# Patient Record
Sex: FEMALE | Race: WHITE | Employment: PART TIME | URBAN - METROPOLITAN AREA
[De-identification: names, ages, dates, MRNs, and addresses within clinical notes are randomized per-mention and may not be internally consistent; named-entity substitution may affect disease eponyms.]

---

## 2018-10-18 ENCOUNTER — OFFICE VISIT (OUTPATIENT)
Dept: UROLOGY | Facility: CLINIC | Age: 33
End: 2018-10-18
Payer: COMMERCIAL

## 2018-10-18 VITALS
OXYGEN SATURATION: 100 % | BODY MASS INDEX: 22.68 KG/M2 | DIASTOLIC BLOOD PRESSURE: 80 MMHG | HEIGHT: 63 IN | HEART RATE: 101 BPM | SYSTOLIC BLOOD PRESSURE: 126 MMHG | WEIGHT: 128 LBS

## 2018-10-18 DIAGNOSIS — N18.6 END STAGE RENAL DISEASE (H): Primary | ICD-10-CM

## 2018-10-18 PROCEDURE — 99205 OFFICE O/P NEW HI 60 MIN: CPT | Performed by: UROLOGY

## 2018-10-18 RX ORDER — LAMOTRIGINE 100 MG/1
TABLET ORAL
COMMUNITY
Start: 2018-10-12

## 2018-10-18 RX ORDER — LAMOTRIGINE 200 MG/1
TABLET ORAL
COMMUNITY
Start: 2018-10-16

## 2018-10-18 RX ORDER — FOLIC ACID 1 MG/1
TABLET ORAL
COMMUNITY
Start: 2018-09-20

## 2018-10-18 RX ORDER — ETHINYL ESTRADIOL/DROSPIRENONE 0.02-3(28)
TABLET ORAL
COMMUNITY
Start: 2018-10-16

## 2018-10-18 ASSESSMENT — PAIN SCALES - GENERAL: PAINLEVEL: NO PAIN (0)

## 2018-10-18 NOTE — LETTER
Date:October 23, 2018      Patient was self referred, no letter generated. Do not send.        [facility] Health Information

## 2018-10-18 NOTE — NURSING NOTE
Chief Complaint   Patient presents with     Kidney Problem     Patient was seen by a Urologist in [facility] she has Tubular Sclerosis

## 2018-10-18 NOTE — MR AVS SNAPSHOT
"              After Visit Summary   10/18/2018    Marita Burns    MRN: [id number]           Patient Information     Date Of Birth          1985        Visit Information        Provider Department      10/18/2018 2:00 PM Nawaf Ingram MD [facility] [place]        Today's Diagnoses     End stage renal disease (H)    -  1       Follow-ups after your visit        Follow-up notes from your care team     Return if symptoms worsen or fail to improve.      Future tests that were ordered for you today     Open Future Orders        Priority Expected Expires Ordered    UA without Microscopic Routine  10/18/2019 10/19/2018            Who to contact     If you have questions or need follow up information about today's clinic visit or your schedule please contact [facility] directly at [phone].  Normal or non-critical lab and imaging results will be communicated to you by Property[employer], letter or phone within 4 business days after the clinic has received the results. If you do not hear from us within 7 days, please contact the clinic through Property[employer] or phone. If you have a critical or abnormal lab result, we will notify you by phone as soon as possible.  Submit refill requests through 19pay or call your pharmacy and they will forward the refill request to us. Please allow 3 business days for your refill to be completed.          Additional Information About Your Visit        Property[employer] Information     19pay lets you send messages to your doctor, view your test results, renew your prescriptions, schedule appointments and more. To sign up, go to [email or web address] . Click on \"Log in\" on the left side of the screen, which will take you to the Welcome page. Then click on \"Sign up Now\" on the right side of the page.     You will be asked to enter the access code listed below, as well as some personal information. Please follow the " "directions to create your username and password.     Your access code is: [email or web address]  Expires: 2019  3:08 PM     Your access code will  in 90 days. If you need help or a new code, please call your [facility] clinic or [phone].        Care EveryWhere ID     This is your Care EveryWhere ID. This could be used by other organizations to access your [facility] medical records  [id number]        Your Vitals Were     Pulse Height Pulse Oximetry BMI (Body Mass Index)          101 1.6 m (5' 3\") 100% 22.67 kg/m2         Blood Pressure from Last 3 Encounters:   10/18/18 126/80    Weight from Last 3 Encounters:   10/18/18 58.1 kg (128 lb)               Primary Care Provider Fax #    Physician No Ref-Primary [phone]       No address on file        Equal Access to Services     [facility] : Hadii natailya blanco Socherie, waaxda luqadaha, qaybta kaalmada adecornelioyada, gregory kearney . So [facility] [phone].    ATENCIÓN: Si habla español, tiene a kerr disposición servicios gratuitos de asistencia lingüística. Llame al [phone].    We comply with applicable federal civil rights laws and [place] laws. We do not discriminate on the basis of race, color, national origin, age, disability, sex, sexual orientation, or gender identity.            Thank you!     Thank you for choosing [facility] UROLOGY [facility]  for your care. Our goal is always to provide you with excellent care. Hearing back from our patients is one way we can continue to improve our services. Please take a few minutes to complete the written survey that you may receive in the mail after your visit with us. Thank you!             Your Updated Medication List - Protect others around you: Learn how to safely use, store and throw away your medicines at www.disposemymeds.org.          This list is accurate as of 10/18/18 11:59 PM.  Always use your most recent med list.                   Brand Name " Dispense Instructions for use Diagnosis    folic acid 1 MG tablet    FOLVITE          * lamoTRIgine 100 MG tablet    LaMICtal          * lamoTRIgine 200 MG tablet    LaMICtal          ADEN 3-0.02 MG per tablet   Generic drug:  drospirenone-ethinyl estradiol           * Notice:  This list has 2 medication(s) that are the same as other medications prescribed for you. Read the directions carefully, and ask your doctor or other care provider to review them with you.

## 2018-10-18 NOTE — LETTER
10/18/2018       RE: Marita Burns  [address] [address]     Dear Colleague,    Thank you for referring your patient, Marita Burns, to the [facility] [place] at [facility]. Please see a copy of my visit note below.    History: It is a great pleasure to meet this very pleasant 33-year-old lady in initial consultation today.  She is here today with her mother.  In summary, she has tuberous sclerosis complex and is seeking my advice regarding lesions in her right kidney.  She had developed seizures at 3 months of age and was diagnosed with tuberous sclerosis.  These were not infantile spasms as such and were controlled.  There is evidence of mild cognitive disability now, but she has full-time work, lives in [place], is articulate and attentive.  She does still get some seizures, but these are mainly petit mal attacks.  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  However most significantly for this consultation, she had a left nephrectomy for renal mass approximately 15 years ago, presents here currently with serum creatinines of 2.85, and there have been recommendations for her to have the remaining kidney removed and to be then considered for renal transplantation.  She works full-time and has an active life at the present time, and has no major complaints at this time.    History reviewed. No pertinent past medical history.    Past Surgical History:   Procedure Laterality Date     NEPHRECTOMY         History reviewed. No pertinent family history.    Social History     Social History     Marital status: Single     Spouse name: N/A     Number of children: N/A     Years of education: N/A     Occupational History     Not on file.     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 patches  Eyes: negative  Ears/Nose/Throat: negative  Respiratory: 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  Cardiovascular: History of possible cardiac rhabdomyomas early in her life  Gastrointestinal: negative  Genitourinary: History of renal masses  Musculoskeletal: negative  Neurologic: seizures  Psychiatric:  Possible very mild cognitive disability  Hematologic/Lymphatic/Immunologic: negative  Endocrine: negative    Exam:  /80 (BP Location: Left arm, Patient Position: Sitting, Cuff Size: Adult Regular)  Pulse 101  Ht 1.6 m (5' 3\")  Wt 58.1 kg (128 lb)  SpO2 100%  BMI 22.67 kg/m2    General Impression: Very pleasant lady who is not in any distress, is conversational and is well oriented in time place and person.    Mental status.  Calm and talkative    HEENT: There is no clinical evidence of jaundice, the mucous membranes are normal, there are some facial angiofibroma noted,    Skin: There were also some amelanotic patches on the skin    Lymph Nodes: None detected    Respiratory System: The respiratory cycle is normal    Cardiovascular: There is no peripheral edema    Abdominal: Abdominal examination is unremarkable    Extremities: The extremities are also unremarkable    Back and Flank: There is no evidence of kyphosis, scoliosis or lordosis.  There is no tenderness over the spine or flanks    Genital: Not examined    Rectal: Not " examined    Neurologic: There are no focal abnormal clinical neurological signs in the central, or peripheral nervous systems    Impression: This lady has a very complex history.  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  She did have her left kidney removed many years ago for renal masses although from the history I do not think she was undergoing a hemorrhage at that time.  The issue now is that her creatinine is 2.85, GFR of 15, she has masses in the remaining right kidney without evidence of hydronephrosis, the largest mass is 4.5 cm and there is a small mass about 3 cm in the lower part of the kidney with small angiomyolipomas seen in the kidney also.  The 2 predominant masses do appear similar and also both appear low in lipid content..  There is no evidence of any intralesional hemorrhage, flank pain or any other related symptoms she has not observed gross hematuria  This is a very challenging situation, however, it is highly likely that the masses in the kidney and not malignant as that the vast majority of low lipid lesions seen in tuberous sclerosis are in fact fact poor benign angiomyolipomas.  I have the opportunity to review the records I received recent indicates the growth rate is very slow which would definitely favor this being benign.  Removal of the kidney would then require her to be on dialysis until a transplant could be found and if there is no living related donor then the wait for a cadaver donor may be lengthy.  In my opinion we should strongly consider the use of Afinitor (Everolimus), as this is FDA approved nontender to treat the brain lesions below both the renal lesions, i.e. angiomyolipoma, associated with tuberous sclerosis.  Results of this treatment indicate a reduction in the size of the lesion of 50%  "can be expected.  The treatment has been well-tolerated, and now is in general use.  Significant side effects of those of stomatitis which is well managed by taking the pill, with something such as Cool Whip, and the initial dose of Afinitor is 10 mg a day.  Follow-up of labs include CBC, basic panel, lipid panel and liver panel.  Some elevation of cholesterol is common.  I have a careful protocol which all my patients on Afinitor will follow.  After some time we often will now reduce the dose to 5 mg a day and maintain the same shrinkage of the lesions which are not only the renal lesions but also the brain lesions and the skin lesions associated with this condition.  Furthermore the most recent trials have shown an improvement in seizure control while on everolimus, and noted even now anecdotal reports of improvements in cognition.  It is my opinion that this patient would be a good candidate for treatment with everolimus even though her renal function is not good as there are also some reports that it may improve her renal function, and that the patient strongly wishes not to lose the kidney at the present time.  It is of course possible that in the course of time she will need a transplant at some point, but it is, in my opinion, an important issue to try and delay that if we can as long as possible.  I will be discussing this with her urologist in [place] and if they do wish to work through the doctors there with her being treated with everolimus, then I will be very happy to forward all our protocols to them and be available to them for advice at any time.  I did discuss the entire situation with the patient and her mother in detail today.  I answered all the questions    Plan: See above note    Time: 60 minutes in excess of.  There is a very complex issue with extensive discussions, review of records both in advance of the meeting and subsequently.    \"This dictation was performed with voice recognition " "software and may contain errors,  omissions and inadvertent word substitution.\"      Again, thank you for allowing me to participate in the care of your patient.      Sincerely,    Nawaf Ingram MD      "

## 2018-10-19 NOTE — PROGRESS NOTES
History: It is a great pleasure to meet this very pleasant 33-year-old lady in initial consultation today.  She is here today with her mother.  In summary, she has tuberous sclerosis complex and is seeking my advice regarding lesions in her right kidney.  She had developed seizures at 3 months of age and was diagnosed with tuberous sclerosis.  These were not infantile spasms as such and were controlled.  There is evidence of mild cognitive disability now, but she has full-time work, lives in [place], is articulate and attentive.  She does still get some seizures, but these are mainly petit mal attacks.  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  However most significantly for this consultation, she had a left nephrectomy for renal mass approximately 15 years ago, presents here currently with serum creatinines of 2.85, and there have been recommendations for her to have the remaining kidney removed and to be then considered for renal transplantation.  She works full-time and has an active life at the present time, and has no major complaints at this time.    History reviewed. No pertinent past medical history.    Past Surgical History:   Procedure Laterality Date     NEPHRECTOMY         History reviewed. No pertinent family history.    Social History     Social History     Marital status: Single     Spouse name: N/A     Number of children: N/A     Years of education: N/A     Occupational History     Not on file.     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 skin patches  Eyes: negative  Ears/Nose/Throat: negative  Respiratory: 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  Cardiovascular: History of possible cardiac rhabdomyomas early in her life  Gastrointestinal: negative  Genitourinary: History of renal masses  Musculoskeletal: negative  Neurologic: seizures  Psychiatric:  Possible very mild cognitive disability  Hematologic/Lymphatic/Immunologic: negative  Endocrine: negative    Exam:  /80 (BP Location: Left arm, Patient Position: Sitting, Cuff Size: Adult Regular)  Pulse 101  Ht 1.6 m (5' 3\")  Wt 58.1 kg (128 lb)  SpO2 100%  BMI 22.67 kg/m2    General Impression: Very pleasant lady who is not in any distress, is conversational and is well oriented in time place and person.    Mental status.  Calm and talkative    HEENT: There is no clinical evidence of jaundice, the mucous membranes are normal, there are some facial angiofibroma noted,    Skin: There were also some amelanotic patches on the skin    Lymph Nodes: None detected    Respiratory System: The respiratory cycle is normal    Cardiovascular: There is no peripheral edema    Abdominal: Abdominal examination is unremarkable    Extremities: The extremities are also unremarkable    Back and Flank: There is no evidence of kyphosis, scoliosis or lordosis.  There is no tenderness over the spine or flanks    Genital: Not examined    Rectal: Not examined    Neurologic: There are no focal abnormal clinical neurological signs in the central, or peripheral nervous systems    Impression: This lady has a very complex history.  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  She did have her left kidney removed many years ago for renal masses although from the history I do not think she was undergoing a hemorrhage at that time.  The issue now is that her creatinine is 2.85, GFR of 15, she has masses in the remaining right kidney without evidence of hydronephrosis, the largest mass is 4.5 cm and there is a small mass about 3 cm in the lower part of the kidney with small angiomyolipomas seen in the kidney also.  The 2 predominant masses do appear similar and also both appear low in lipid content..  There is no evidence of any intralesional hemorrhage, flank pain or any other related symptoms she has not observed gross hematuria  This is a very challenging situation, however, it is highly likely that the masses in the kidney and not malignant as that the vast majority of low lipid lesions seen in tuberous sclerosis are in fact fact poor benign angiomyolipomas.  I have the opportunity to review the records I received recent indicates the growth rate is very slow which would definitely favor this being benign.  Removal of the kidney would then require her to be on dialysis until a transplant could be found and if there is no living related donor then the wait for a cadaver donor may be lengthy.  In my opinion we should strongly consider the use of Afinitor (Everolimus), as this is FDA approved nontender to treat the brain lesions below both the renal lesions, i.e. angiomyolipoma, associated with tuberous sclerosis.  Results of this treatment indicate a reduction in the size of the lesion of 50% can be expected.  The treatment has been well-tolerated, and now is in general use.  Significant side effects of those of stomatitis which is well managed by taking the pill, with something such as Cool Whip, and the initial dose of Afinitor is 10 mg a day.  Follow-up of labs include CBC, basic panel, lipid panel and liver  "panel.  Some elevation of cholesterol is common.  I have a careful protocol which all my patients on Afinitor will follow.  After some time we often will now reduce the dose to 5 mg a day and maintain the same shrinkage of the lesions which are not only the renal lesions but also the brain lesions and the skin lesions associated with this condition.  Furthermore the most recent trials have shown an improvement in seizure control while on everolimus, and noted even now anecdotal reports of improvements in cognition.  It is my opinion that this patient would be a good candidate for treatment with everolimus even though her renal function is not good as there are also some reports that it may improve her renal function, and that the patient strongly wishes not to lose the kidney at the present time.  It is of course possible that in the course of time she will need a transplant at some point, but it is, in my opinion, an important issue to try and delay that if we can as long as possible.  I will be discussing this with her urologist in [place] and if they do wish to work through the doctors there with her being treated with everolimus, then I will be very happy to forward all our protocols to them and be available to them for advice at any time.  I did discuss the entire situation with the patient and her mother in detail today.  I answered all the questions    Plan: See above note    Time: 60 minutes in excess of.  There is a very complex issue with extensive discussions, review of records both in advance of the meeting and subsequently.    \"This dictation was performed with voice recognition software and may contain errors,  omissions and inadvertent word substitution.\"    "

## 2019-08-05 ENCOUNTER — TELEPHONE (OUTPATIENT)
Dept: UROLOGY | Facility: CLINIC | Age: 34
End: 2019-08-05

## 2019-08-05 NOTE — TELEPHONE ENCOUNTER
[facility] Call Center    Phone Message    May a detailed message be left on voicemail: yes    Reason for Call: Other: Pt mother called in wants to know if she should fly back out here to see him she cant get the medication he reccomended where shes from please call back thank you      Action Taken: Message routed to:  [facility] uro

## 2019-08-06 NOTE — TELEPHONE ENCOUNTER
Called patient and LM. Need more information.  Will wait for a return phone call.     Livia St LPN

## 2020-06-03 ENCOUNTER — TELEPHONE (OUTPATIENT)
Dept: UROLOGY | Facility: CLINIC | Age: 35
End: 2020-06-03

## 2020-06-03 NOTE — TELEPHONE ENCOUNTER
"[facility] Call Center    Phone Message    May a detailed message be left on voicemail: yes     Reason for Call: Other: Carola calling on behald of her daughter Marita to get contact information to Dr. Ingram. Carola says at one of Marita's past appointments, they had discussed having her do Afinitor treatment. Carola says they have found a provider at [facility] by the name of Dr. Barr who could do this treatment. Carola is wondering if Dr. Ingram would connect with this doctor so that the protocol for overseeing Marita for that treatment can be sent. Carola says the number for Dr. Barr is [phone]. Carola also wanted to check if the imaging was sent for Marita's upcoming appointment. Marita's appointment was rescheduled to 6/22/20 from 6/17/20 because it was marked for \"Needs to be Rescheduled.\" Please give Carola a call back at your earliest convenience to discuss.     Action Taken: Message routed to:  Other: [facility]    Travel Screening: Not Applicable                                                                      "

## 2020-06-15 ENCOUNTER — TRANSFERRED RECORDS (OUTPATIENT)
Dept: HEALTH INFORMATION MANAGEMENT | Facility: CLINIC | Age: 35
End: 2020-06-15

## 2020-06-22 ENCOUNTER — TELEPHONE (OUTPATIENT)
Dept: UROLOGY | Facility: CLINIC | Age: 35
End: 2020-06-22

## 2020-06-22 NOTE — TELEPHONE ENCOUNTER
[facility] Call Center    Phone Message    May a detailed message be left on voicemail: yes     Reason for Call: Other: In light of your recent unplanned telephone discussion with Pt mother last week Pt mother would like to know if you still want them to do the video appt you have scheduled for this week.  Please contact Pt mother to discuss     Action Taken: Message routed to:  [facility] urology    Travel Screening: Not Applicable

## 2020-06-24 ENCOUNTER — VIRTUAL VISIT (OUTPATIENT)
Dept: UROLOGY | Facility: CLINIC | Age: 35
End: 2020-06-24
Payer: MEDICARE

## 2020-06-24 VITALS — WEIGHT: 138 LBS | BODY MASS INDEX: 24.45 KG/M2 | HEIGHT: 63 IN

## 2020-06-24 DIAGNOSIS — D17.71 RENAL ANGIOMYOLIPOMA: ICD-10-CM

## 2020-06-24 DIAGNOSIS — Q85.1 TUBEROUS SCLEROSIS (H): Primary | ICD-10-CM

## 2020-06-24 DIAGNOSIS — N18.6 END STAGE RENAL DISEASE (H): ICD-10-CM

## 2020-06-24 PROCEDURE — 99213 OFFICE O/P EST LOW 20 MIN: CPT | Mod: 95 | Performed by: UROLOGY

## 2020-06-24 ASSESSMENT — PAIN SCALES - GENERAL: PAINLEVEL: NO PAIN (0)

## 2020-06-24 ASSESSMENT — MIFFLIN-ST. JEOR: SCORE: 1295.09

## 2020-06-24 NOTE — LETTER
"6/24/2020       RE: Marita Burns  [address] [facility] [address]     Dear Colleague,    Thank you for referring your patient, Marita Burns, to the [facility]OLOGY [facility]IN[facility] [place] at [facility]. Please see a copy of my visit note below.    Martia Burns is a 34 year old female who is being evaluated via a billable video visit.      The patient has been notified of following:     \"This video visit will be conducted via a call between you and your physician/provider. We have found that certain health care needs can be provided without the need for an in-person physical exam.  This service lets us provide the care you need with a video conversation.  If a prescription is necessary we can send it directly to your pharmacy.  If lab work is needed we can place an order for that and you can then stop by our lab to have the test done at a later time.    Video visits are billed at different rates depending on your insurance coverage.  Please reach out to your insurance provider with any questions.    If during the course of the call the physician/provider feels a video visit is not appropriate, you will not be charged for this service.\"    Patient has given verbal consent for Video visit? Yes    Will anyone else be joining your video visit? No, provider talked with mother        Video-Visit Details    Type of service:  Video Visit    It is a great pleasure to have a video consultation with the mother and guardian of this pleasant 34-year-old lady today who lives in [place] and has tuberous sclerosis complex.  As we recall, she has a history of seizures starting at the age of 3 months and was diagnosed with tuberosclerosis at that time although she did not have infantile spasms.  The seizures have been well controlled she has mild cognitive disability at this time but is working at [employer] working full-time.  She does occasionally " have petit mal attacks now.  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  She had a nephrectomy of the renal mass in the left kidney 17 years ago.  She now has a creatinine of 3.8 with close to end-stage renal disease, although this seems to be relatively stable at the moment.  She has masses in her remaining kidney with the largest is 4.5 cm in diameter and the smaller one is 3 cm in diameter.  We have had discussions before about whether we should consider the use of Afinitor.  She has been seen at [facility] in [place] with plans to consider renal transplant and the question then is should the remaining kidney be removed at that time.  However, she is working she is doing well with her only kidney at the moment and the creatinine although considerably elevated seems to be relatively stable.  The question then has been would the use of Afinitor and shrinkage of the lesions in the remaining kidney bring about any improvement in the overall situation.  This I discussed with the patient's mother and guardian today.  Unfortunately we do not have strong evidence that the use of Afinitor will enhance renal function its main benefit is preventing intralesional hemorrhage which is a possibility once the lesions get much about 3 cm in diameter.  The patient's personal physician in her hometown has shown some interest in me able to help monitor the use of Afinitor if you want to try it in the short-term to see if there is any benefit.  Ultimately she will likely end up needing a transplant at some point either with a living related donor or with a cadaver kidney.  Certainly the little experience are seen in tuberosclerosis with renal transplants has been very positive when done at the right time and certainly would be superior to long-term management by dialysis.  It is also possible that part of the treatment to prevent rejection could include everolimus which is well known  to be 1 of the therapies considered for transplant rejection prevention.  I will therefore have a discussion with Dr. Barr in [place] who is the patient's personal physician to see if he is willing to help coordinate the treatment with Afinitor using the protocol we traditionally use here.  I have asked the patient's mother to be in contact with me if there are any questions whatsoever.  I also expressed great confidence in the urology and transplant service at [facility] in [place] where I have visited on a number of occasions.  I did go over this very carefully with the patient's mother in detail today.  I answered all her questions.    Plan.  I will communicate with the patient's personal physician regarding these issues.  I will be happy to answer any other questions if they arise.    Video time was approximately 15 minutes.  There was extensive review of these complicated records immediately prior to actual video time    Originating Location (pt. Location): Home    Distant Location (provider location):  [facility]OLOGY [facility] [place]     Platform used for Video Visit: Samantha Ingram MD        Again, thank you for allowing me to participate in the care of your patient.      Sincerely,    Nawaf Ingram MD

## 2020-06-24 NOTE — LETTER
Date:June 29, 2020      Patient was self referred, no letter generated. Do not send.        [facility] Health Information

## 2020-06-24 NOTE — PROGRESS NOTES
"Marita Burns is a 34 year old female who is being evaluated via a billable video visit.      The patient has been notified of following:     \"This video visit will be conducted via a call between you and your physician/provider. We have found that certain health care needs can be provided without the need for an in-person physical exam.  This service lets us provide the care you need with a video conversation.  If a prescription is necessary we can send it directly to your pharmacy.  If lab work is needed we can place an order for that and you can then stop by our lab to have the test done at a later time.    Video visits are billed at different rates depending on your insurance coverage.  Please reach out to your insurance provider with any questions.    If during the course of the call the physician/provider feels a video visit is not appropriate, you will not be charged for this service.\"    Patient has given verbal consent for Video visit? Yes    Will anyone else be joining your video visit? No, provider talked with mother        Video-Visit Details    Type of service:  Video Visit    It is a great pleasure to have a video consultation with the mother and guardian of this pleasant 34-year-old lady today who lives in [place] and has tuberous sclerosis complex.  As we recall, she has a history of seizures starting at the age of 3 months and was diagnosed with tuberosclerosis at that time although she did not have infantile spasms.  The seizures have been well controlled she has mild cognitive disability at this time but is working at [employer] working full-time.  She does occasionally have petit mal attacks now.  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  She had a nephrectomy of the renal mass in the left kidney 17 years ago.  She now has a creatinine of 3.8 with close to end-stage renal disease, although this seems to be relatively stable at the " moment.  She has masses in her remaining kidney with the largest is 4.5 cm in diameter and the smaller one is 3 cm in diameter.  We have had discussions before about whether we should consider the use of Afinitor.  She has been seen at [facility] in [place] with plans to consider renal transplant and the question then is should the remaining kidney be removed at that time.  However, she is working she is doing well with her only kidney at the moment and the creatinine although considerably elevated seems to be relatively stable.  The question then has been would the use of Afinitor and shrinkage of the lesions in the remaining kidney bring about any improvement in the overall situation.  This I discussed with the patient's mother and guardian today.  Unfortunately we do not have strong evidence that the use of Afinitor will enhance renal function its main benefit is preventing intralesional hemorrhage which is a possibility once the lesions get much about 3 cm in diameter.  The patient's personal physician in her hometown has shown some interest in me able to help monitor the use of Afinitor if you want to try it in the short-term to see if there is any benefit.  Ultimately she will likely end up needing a transplant at some point either with a living related donor or with a cadaver kidney.  Certainly the little experience are seen in tuberosclerosis with renal transplants has been very positive when done at the right time and certainly would be superior to long-term management by dialysis.  It is also possible that part of the treatment to prevent rejection could include everolimus which is well known to be 1 of the therapies considered for transplant rejection prevention.  I will therefore have a discussion with Dr. Barr in [place] who is the patient's personal physician to see if he is willing to help coordinate the treatment with Afinitor using the protocol we traditionally use here.  I have asked the  patient's mother to be in contact with me if there are any questions whatsoever.  I also expressed great confidence in the urology and transplant service at [facility] in [place] where I have visited on a number of occasions.  I did go over this very carefully with the patient's mother in detail today.  I answered all her questions.    Plan.  I will communicate with the patient's personal physician regarding these issues.  I will be happy to answer any other questions if they arise.    Video time was approximately 15 minutes.  There was extensive review of these complicated records immediately prior to actual video time    Originating Location (pt. Location): Home    Distant Location (provider location):  [facility] UROLOGY [facility] [place]     Platform used for Video Visit: Samantha Ingram MD

## 2020-06-24 NOTE — NURSING NOTE
Chief Complaint   Patient presents with     Follow Up     kidney disease   Talked with patients mother about her follow up.  Hafsa Monsalve LPN

## 2020-06-24 NOTE — NURSING NOTE
Left message for patient about canceling todays' appointment as the provider stated he had spoken with the patient and her mother last week and he thought he had told them when to reschedule next appointment.  Hafsa Monsalve LPN

## 2020-10-19 ENCOUNTER — TRANSFERRED RECORDS (OUTPATIENT)
Dept: HEALTH INFORMATION MANAGEMENT | Facility: CLINIC | Age: 35
End: 2020-10-19

## 2021-01-03 ENCOUNTER — HEALTH MAINTENANCE LETTER (OUTPATIENT)
Age: 36
End: 2021-01-03

## 2021-04-19 ENCOUNTER — TRANSFERRED RECORDS (OUTPATIENT)
Dept: HEALTH INFORMATION MANAGEMENT | Facility: CLINIC | Age: 36
End: 2021-04-19

## 2021-10-10 ENCOUNTER — HEALTH MAINTENANCE LETTER (OUTPATIENT)
Age: 36
End: 2021-10-10

## 2022-01-29 ENCOUNTER — HEALTH MAINTENANCE LETTER (OUTPATIENT)
Age: 37
End: 2022-01-29

## 2022-09-18 ENCOUNTER — HEALTH MAINTENANCE LETTER (OUTPATIENT)
Age: 37
End: 2022-09-18

## 2023-05-07 ENCOUNTER — HEALTH MAINTENANCE LETTER (OUTPATIENT)
Age: 38
End: 2023-05-07